# Patient Record
Sex: FEMALE | Race: WHITE
[De-identification: names, ages, dates, MRNs, and addresses within clinical notes are randomized per-mention and may not be internally consistent; named-entity substitution may affect disease eponyms.]

---

## 2024-03-28 ENCOUNTER — HOSPITAL ENCOUNTER (OUTPATIENT)
Dept: HOSPITAL 99 - RAD | Age: 69
End: 2024-03-28
Payer: MEDICARE

## 2024-03-28 DIAGNOSIS — R10.11: Primary | ICD-10-CM

## 2024-04-18 ENCOUNTER — CONSULT (OUTPATIENT)
Dept: GASTROENTEROLOGY | Facility: CLINIC | Age: 69
End: 2024-04-18
Payer: MEDICARE

## 2024-04-18 VITALS
HEIGHT: 60 IN | SYSTOLIC BLOOD PRESSURE: 140 MMHG | WEIGHT: 119.2 LBS | DIASTOLIC BLOOD PRESSURE: 84 MMHG | BODY MASS INDEX: 23.4 KG/M2

## 2024-04-18 DIAGNOSIS — Z12.11 COLON CANCER SCREENING: ICD-10-CM

## 2024-04-18 DIAGNOSIS — Z86.010 PERSONAL HISTORY OF COLONIC POLYPS: ICD-10-CM

## 2024-04-18 DIAGNOSIS — M79.7 FIBROMYALGIA: ICD-10-CM

## 2024-04-18 DIAGNOSIS — R10.11 RIGHT UPPER QUADRANT ABDOMINAL PAIN: Primary | ICD-10-CM

## 2024-04-18 DIAGNOSIS — K58.8 OTHER IRRITABLE BOWEL SYNDROME: ICD-10-CM

## 2024-04-18 DIAGNOSIS — D49.0 IPMN (INTRADUCTAL PAPILLARY MUCINOUS NEOPLASM): ICD-10-CM

## 2024-04-18 PROBLEM — I34.1 MITRAL VALVE PROLAPSE: Status: ACTIVE | Noted: 2024-04-18

## 2024-04-18 PROBLEM — M41.9 SCOLIOSIS: Status: ACTIVE | Noted: 2024-04-18

## 2024-04-18 PROBLEM — F41.0 PANIC ATTACKS: Status: ACTIVE | Noted: 2024-04-18

## 2024-04-18 PROBLEM — K58.9 IRRITABLE BOWEL SYNDROME: Status: ACTIVE | Noted: 2024-04-18

## 2024-04-18 PROBLEM — I47.10 SVT (SUPRAVENTRICULAR TACHYCARDIA): Status: ACTIVE | Noted: 2024-04-18

## 2024-04-18 PROBLEM — K57.90 DIVERTICULOSIS: Status: ACTIVE | Noted: 2024-04-18

## 2024-04-18 PROBLEM — K86.2 PANCREATIC CYST: Status: ACTIVE | Noted: 2024-04-18

## 2024-04-18 PROCEDURE — 99204 OFFICE O/P NEW MOD 45 MIN: CPT | Performed by: PHYSICIAN ASSISTANT

## 2024-04-18 RX ORDER — ZOLPIDEM TARTRATE 10 MG/1
1 TABLET ORAL
COMMUNITY

## 2024-04-18 RX ORDER — DICYCLOMINE HYDROCHLORIDE 10 MG/1
CAPSULE ORAL
COMMUNITY
Start: 2024-03-25

## 2024-04-18 RX ORDER — NORTRIPTYLINE HYDROCHLORIDE 10 MG/1
10 CAPSULE ORAL
Qty: 30 CAPSULE | Refills: 3 | Status: SHIPPED | OUTPATIENT
Start: 2024-04-18

## 2024-04-18 NOTE — PROGRESS NOTES
[facility] - Outpatient New Patient Initial Visit  Tanvi Babcock 68 y.o. female MRN: [id number]  Encounter: [id number]    ASSESSMENT AND PLAN:    1. Right upper quadrant abdominal pain  Ongoing x3 mos; US gallbladder showed 0.5 cm gallbladder polyp. Sx not consistent with biliary colic. Last MRI for pancreatic cysts was 2018; MRIs over 4 years showed no change. Cysts felt to be IPMNs. Given exam findings today, I am suspicious of costochondritis (tender ribs palpation exam, sx improve with moist heat) vs IBS  Recommend treatment strategies for costochondritis  START nortriptyline 10 mg QHS for IBS. Discussed common side effects including dry mouth, blurry vision, constipation, sweating, tremor, drowsiness, and/or decreased libido/ED.  Follow up in 2 mos  Patient due for colonoscopy - not ready to schedule at this time, will revisit at follow up visit in 2 mos  - nortriptyline (PAMELOR) 10 mg capsule; Take 1 capsule (10 mg total) by mouth daily at bedtime  Dispense: 30 capsule; Refill: 3    2. Other irritable bowel syndrome  Plan as above to start TCA  - nortriptyline (PAMELOR) 10 mg capsule; Take 1 capsule (10 mg total) by mouth daily at bedtime  Dispense: 30 capsule; Refill: 3    3. Fibromyalgia  Chronic, at baseline  Uses CBD tincture weekly for sx    4. Colon cancer screening  Last colonoscopy: 2019  recall: 5 yr(s)  due: 2023    5. Personal history of colonic polyps  Excised on last colonoscopy 2019    6. IPMN (intraductal papillary mucinous neoplasm)  Noted on abdominal MRIs 2011, most recently on 2018 (unchanged from initial MRI)     Return in about 2 months (around 6/18/2024) for follow up, with me.    Chief Complaint   Patient presents with    Abdominal Pain     Pt has been experiencing upper abdominal pain radiating into back for months. Pt had ultrasound at [place], gallbladder polyp. Recommended GI consult.  Previous MRI at [place] showed pancreatic cysts, no  "change in 8 years.       HPI:   Accompanied by self and history is obtained from self.    Tanvi Babcock is a 68 y.o. year old female with a PMH significant for IBS, chronic pain, diverticulosis, anxiety who presents as a new patient for initial evaluation of RUQ pain.    HPI    RUQ pain  Ongoing x3 mos  Aching, sometime spasms  Always there; 1/10 to 3/10  Sometimes radiates to back/shoulder  -follows ribs; notes her ribs always hurt  Feels better in AM when awakening  Feels worse at night  No change if eating/doesn't eat  Sensation of incomplete emptying after BM in past 1-2 mos  Gets 20 g fiber/day  Relief w/ heating pad, warm shower  Minimal relief w/ dicyclomine that PCP gave her; uses very infrequently  Chronic stressors  Had prior pain like this several yrs ago  -few small benign pancreatic cysts noted on MRI abdomen, no growth in 4 yrs  Recent US abdomen largely unremarkable, 0.3 cm GB polyp.  Recent labs unremarkable including CMP, CBC, TSH    Intermittent new LLQ pain in past 1 month    She denies fevers, chills, unintentional weight loss, odynophagia, dysphagia, heartburn/regurgitation, vomiting, diarrhea, rectal pain, hematochezia, melena.    GI History:  Previous issues: as above  Blood thinners: ASA: no antiplatelet: no anticoagulation: no  NSAID use: rarely  Caffeine use: 2-3 cups 1/2 decaf coffee/day  Tobacco use: none  EtOH use: 7 oz wine 5 days/wk  Cannabis use: CBD tincture weekly, no TSH    Abdominal Surgical Hx: None  Family Hx: Denies first degree relatives with GI malignancies.  GI procedure Hx:  EGD: ? 2004  Colonoscopy: 2019, 5 yr recall  GI imaging Hx: US abdomen 3/28/2024: \"IMPRESSION: No evidence of cholelithiasis, gallbladder wall thickening or biliary tract dilatation.  0.5 cm gallbladder polyp.  Small bilateral simple parapelvic left renal cysts.\"    Review of Systems   Constitutional:  Negative for appetite change, chills, fever and unexpected weight change.   HENT:  Negative for dental " problem, mouth sores, sore throat, trouble swallowing and voice change.    Respiratory:  Negative for cough and choking.    Cardiovascular:  Negative for chest pain and leg swelling.   Gastrointestinal:  Positive for abdominal pain. Negative for abdominal distention, anal bleeding, blood in stool, constipation, diarrhea, nausea, rectal pain and vomiting.   Skin:  Negative for pallor and rash.   Neurological:  Negative for weakness, light-headedness and headaches.   Psychiatric/Behavioral:  Positive for dysphoric mood. Negative for confusion and sleep disturbance. The patient is nervous/anxious.        Historical Information   Past Medical History:   Diagnosis Date    Colon polyp 2019    0.4mm    Diverticulitis of colon     Irritable bowel syndrome      Past Surgical History:   Procedure Laterality Date    COLONOSCOPY  2019    [place]     Social History     Substance and Sexual Activity   Alcohol Use Yes    Comment: 7oz 5 days/wek     Social History     Substance and Sexual Activity   Drug Use Never    Comment: CBD oil     Social History     Tobacco Use   Smoking Status Never    Passive exposure: Never   Smokeless Tobacco Never     Family History   Problem Relation Age of Onset    No Known Problems Mother     No Known Problems Father     No Known Problems Maternal Grandmother     No Known Problems Maternal Grandfather     No Known Problems Paternal Grandmother     No Known Problems Paternal Grandfather     Colon cancer Paternal Uncle        Meds/Allergies     Current Outpatient Medications:     dicyclomine (BENTYL) 10 mg capsule    Lysine 1000 MG TABS    nortriptyline (PAMELOR) 10 mg capsule    zolpidem (Ambien) 10 mg tablet    Allergies   Allergen Reactions    Penicillins Other (See Comments)       PHYSICAL EXAM:    Blood pressure 140/84, height 5' (1.524 m), weight 54.1 kg (119 lb 3.2 oz). Body mass index is 23.28 kg/m².    Physical Exam  Vitals and nursing note reviewed.   Constitutional:       General: She is  not in acute distress.     Appearance: She is not toxic-appearing.   HENT:      Head: Normocephalic.      Mouth/Throat:      Mouth: Mucous membranes are moist.      Pharynx: Oropharynx is clear.   Eyes:      General: No scleral icterus.     Extraocular Movements: Extraocular movements intact.   Neck:      Trachea: Phonation normal.   Cardiovascular:      Rate and Rhythm: Normal rate and regular rhythm.      Heart sounds: No murmur heard.     No friction rub. No gallop.   Pulmonary:      Effort: Pulmonary effort is normal. No respiratory distress.      Breath sounds: No wheezing, rhonchi or rales.   Chest:      Chest wall: Tenderness (R-side ribs on palpation) present. No mass or crepitus.       Abdominal:      General: Bowel sounds are normal. There is no distension or abdominal bruit.      Palpations: Abdomen is soft. There is no hepatomegaly or splenomegaly.      Tenderness: There is abdominal tenderness (mild) in the right upper quadrant and periumbilical area. There is no guarding or rebound.   Musculoskeletal:      Cervical back: Neck supple.      Comments: Moving all 4 extremities spontaneously   Skin:     General: Skin is warm and dry.      Capillary Refill: Capillary refill takes less than 2 seconds.      Coloration: Skin is not jaundiced or pale.   Neurological:      General: No focal deficit present.      Mental Status: She is alert and oriented to person, place, and time.   Psychiatric:         Mood and Affect: Mood is anxious (congruent affect) and depressed.         Behavior: Behavior normal. Behavior is cooperative.         Thought Content: Thought content normal.         Lab Results:   No results found.     Radiology Results:   No results found.    I have spent a total time of 30 minutes on 04/18/24 in caring for this patient including Diagnostic results, Prognosis, Risks and benefits of tx options, Instructions for management, Patient and family education, Importance of tx compliance, Risk factor  reductions, Impressions, Counseling / Coordination of care, Documenting in the medical record, Reviewing / ordering tests, medicine, procedures  , and Obtaining or reviewing history  .    Patient expressed understanding and had all questions and concerns addressed.    Odalis Estrada PA-C  04/18/24  9:45 AM    This chart was completed in part utilizing [employer] speech voice recognition software. Random word insertions, pronoun errors, and incomplete sentences are an occasional consequence of this system due to software limitations, and ambient noise. Any questions or concerns about the content, text, or information contained within the body of this dictation should be directly addressed to the provider for clarification.

## 2024-05-12 DIAGNOSIS — R10.11 RIGHT UPPER QUADRANT ABDOMINAL PAIN: ICD-10-CM

## 2024-05-12 DIAGNOSIS — K58.8 OTHER IRRITABLE BOWEL SYNDROME: ICD-10-CM

## 2024-05-14 RX ORDER — NORTRIPTYLINE HYDROCHLORIDE 10 MG/1
10 CAPSULE ORAL
Qty: 90 CAPSULE | Refills: 1 | Status: SHIPPED | OUTPATIENT
Start: 2024-05-14

## 2024-08-02 ENCOUNTER — HOSPITAL ENCOUNTER (OUTPATIENT)
Dept: HOSPITAL 99 - PAVMRI | Age: 69
End: 2024-08-02
Payer: MEDICARE

## 2024-08-02 DIAGNOSIS — K86.2: ICD-10-CM

## 2024-08-02 DIAGNOSIS — R10.11: Primary | ICD-10-CM

## 2024-08-02 PROCEDURE — A9575 INJ GADOTERATE MEGLUMI 0.1ML: HCPCS

## 2024-10-25 ENCOUNTER — HOSPITAL ENCOUNTER (OUTPATIENT)
Dept: HOSPITAL 99 - RAD | Age: 69
End: 2024-10-25
Payer: MEDICARE

## 2024-10-25 DIAGNOSIS — K82.4: Primary | ICD-10-CM

## 2024-11-01 ENCOUNTER — HOSPITAL ENCOUNTER (OUTPATIENT)
Dept: HOSPITAL 99 - RAD | Age: 69
End: 2024-11-01
Payer: MEDICARE

## 2024-11-01 DIAGNOSIS — R10.11: Primary | ICD-10-CM

## 2024-11-01 PROCEDURE — A9537 TC99M MEBROFENIN: HCPCS
